# Patient Record
Sex: FEMALE | Race: BLACK OR AFRICAN AMERICAN | NOT HISPANIC OR LATINO | URBAN - METROPOLITAN AREA
[De-identification: names, ages, dates, MRNs, and addresses within clinical notes are randomized per-mention and may not be internally consistent; named-entity substitution may affect disease eponyms.]

---

## 2019-07-24 ENCOUNTER — TRANSCRIBE ORDERS (OUTPATIENT)
Dept: LAB | Facility: CLINIC | Age: 44
End: 2019-07-24

## 2019-07-24 ENCOUNTER — APPOINTMENT (OUTPATIENT)
Dept: LAB | Facility: CLINIC | Age: 44
End: 2019-07-24
Payer: COMMERCIAL

## 2019-07-24 DIAGNOSIS — Z79.899 ENCOUNTER FOR LONG-TERM (CURRENT) USE OF OTHER MEDICATIONS: ICD-10-CM

## 2019-07-24 DIAGNOSIS — Z79.899 ENCOUNTER FOR LONG-TERM (CURRENT) USE OF OTHER MEDICATIONS: Primary | ICD-10-CM

## 2019-07-24 LAB
ALBUMIN SERPL BCP-MCNC: 4.5 G/DL (ref 3.5–5)
ALP SERPL-CCNC: 97 U/L (ref 46–116)
ALT SERPL W P-5'-P-CCNC: 28 U/L (ref 12–78)
ANION GAP SERPL CALCULATED.3IONS-SCNC: 3 MMOL/L (ref 4–13)
AST SERPL W P-5'-P-CCNC: 16 U/L (ref 5–45)
BASOPHILS # BLD AUTO: 0.02 THOUSANDS/ΜL (ref 0–0.1)
BASOPHILS NFR BLD AUTO: 1 % (ref 0–1)
BILIRUB SERPL-MCNC: 0.18 MG/DL (ref 0.2–1)
BUN SERPL-MCNC: 14 MG/DL (ref 5–25)
CALCIUM SERPL-MCNC: 8.9 MG/DL (ref 8.3–10.1)
CHLORIDE SERPL-SCNC: 110 MMOL/L (ref 100–108)
CHOLEST SERPL-MCNC: 177 MG/DL (ref 50–200)
CO2 SERPL-SCNC: 22 MMOL/L (ref 21–32)
CREAT SERPL-MCNC: 1 MG/DL (ref 0.6–1.3)
EOSINOPHIL # BLD AUTO: 0.05 THOUSAND/ΜL (ref 0–0.61)
EOSINOPHIL NFR BLD AUTO: 2 % (ref 0–6)
ERYTHROCYTE [DISTWIDTH] IN BLOOD BY AUTOMATED COUNT: 12.7 % (ref 11.6–15.1)
GFR SERPL CREATININE-BSD FRML MDRD: 80 ML/MIN/1.73SQ M
GLUCOSE P FAST SERPL-MCNC: 71 MG/DL (ref 65–99)
HCT VFR BLD AUTO: 40.3 % (ref 34.8–46.1)
HDLC SERPL-MCNC: 77 MG/DL (ref 40–60)
HGB BLD-MCNC: 12.8 G/DL (ref 11.5–15.4)
IMM GRANULOCYTES # BLD AUTO: 0 THOUSAND/UL (ref 0–0.2)
IMM GRANULOCYTES NFR BLD AUTO: 0 % (ref 0–2)
LDLC SERPL CALC-MCNC: 89 MG/DL (ref 0–100)
LYMPHOCYTES # BLD AUTO: 1.19 THOUSANDS/ΜL (ref 0.6–4.47)
LYMPHOCYTES NFR BLD AUTO: 49 % (ref 14–44)
MCH RBC QN AUTO: 31.4 PG (ref 26.8–34.3)
MCHC RBC AUTO-ENTMCNC: 31.8 G/DL (ref 31.4–37.4)
MCV RBC AUTO: 99 FL (ref 82–98)
MONOCYTES # BLD AUTO: 0.18 THOUSAND/ΜL (ref 0.17–1.22)
MONOCYTES NFR BLD AUTO: 7 % (ref 4–12)
NEUTROPHILS # BLD AUTO: 1.01 THOUSANDS/ΜL (ref 1.85–7.62)
NEUTS SEG NFR BLD AUTO: 41 % (ref 43–75)
NONHDLC SERPL-MCNC: 100 MG/DL
NRBC BLD AUTO-RTO: 0 /100 WBCS
PLATELET # BLD AUTO: 198 THOUSANDS/UL (ref 149–390)
PMV BLD AUTO: 10.7 FL (ref 8.9–12.7)
POTASSIUM SERPL-SCNC: 3.3 MMOL/L (ref 3.5–5.3)
PROT SERPL-MCNC: 9.1 G/DL (ref 6.4–8.2)
RBC # BLD AUTO: 4.08 MILLION/UL (ref 3.81–5.12)
SODIUM SERPL-SCNC: 135 MMOL/L (ref 136–145)
TRIGL SERPL-MCNC: 55 MG/DL
TSH SERPL DL<=0.05 MIU/L-ACNC: 1.08 UIU/ML (ref 0.36–3.74)
WBC # BLD AUTO: 2.45 THOUSAND/UL (ref 4.31–10.16)

## 2019-07-24 PROCEDURE — 84443 ASSAY THYROID STIM HORMONE: CPT

## 2019-07-24 PROCEDURE — 36415 COLL VENOUS BLD VENIPUNCTURE: CPT

## 2019-07-24 PROCEDURE — 80053 COMPREHEN METABOLIC PANEL: CPT

## 2019-07-24 PROCEDURE — 80061 LIPID PANEL: CPT

## 2019-07-24 PROCEDURE — 85025 COMPLETE CBC W/AUTO DIFF WBC: CPT

## 2020-02-06 ENCOUNTER — APPOINTMENT (OUTPATIENT)
Dept: LAB | Facility: CLINIC | Age: 45
End: 2020-02-06
Payer: COMMERCIAL

## 2020-02-06 ENCOUNTER — TRANSCRIBE ORDERS (OUTPATIENT)
Dept: LAB | Facility: CLINIC | Age: 45
End: 2020-02-06

## 2020-02-06 DIAGNOSIS — G40.509 NONINTRACTABLE EPILEPSY DUE TO EXTERNAL CAUSES, WITHOUT STATUS EPILEPTICUS (HCC): Primary | ICD-10-CM

## 2020-02-06 DIAGNOSIS — G40.509 NONINTRACTABLE EPILEPSY DUE TO EXTERNAL CAUSES, WITHOUT STATUS EPILEPTICUS (HCC): ICD-10-CM

## 2020-02-06 LAB
ALBUMIN SERPL BCP-MCNC: 4 G/DL (ref 3.5–5)
ALP SERPL-CCNC: 86 U/L (ref 46–116)
ALT SERPL W P-5'-P-CCNC: 19 U/L (ref 12–78)
ANION GAP SERPL CALCULATED.3IONS-SCNC: 4 MMOL/L (ref 4–13)
AST SERPL W P-5'-P-CCNC: 12 U/L (ref 5–45)
BASOPHILS # BLD AUTO: 0.03 THOUSANDS/ΜL (ref 0–0.1)
BASOPHILS NFR BLD AUTO: 1 % (ref 0–1)
BILIRUB SERPL-MCNC: 0.35 MG/DL (ref 0.2–1)
BUN SERPL-MCNC: 6 MG/DL (ref 5–25)
CALCIUM SERPL-MCNC: 9.3 MG/DL (ref 8.3–10.1)
CHLORIDE SERPL-SCNC: 113 MMOL/L (ref 100–108)
CO2 SERPL-SCNC: 22 MMOL/L (ref 21–32)
CREAT SERPL-MCNC: 0.93 MG/DL (ref 0.6–1.3)
EOSINOPHIL # BLD AUTO: 0.1 THOUSAND/ΜL (ref 0–0.61)
EOSINOPHIL NFR BLD AUTO: 4 % (ref 0–6)
ERYTHROCYTE [DISTWIDTH] IN BLOOD BY AUTOMATED COUNT: 12.3 % (ref 11.6–15.1)
GFR SERPL CREATININE-BSD FRML MDRD: 87 ML/MIN/1.73SQ M
GLUCOSE P FAST SERPL-MCNC: 76 MG/DL (ref 65–99)
HCT VFR BLD AUTO: 41.5 % (ref 34.8–46.1)
HGB BLD-MCNC: 13.3 G/DL (ref 11.5–15.4)
IMM GRANULOCYTES # BLD AUTO: 0 THOUSAND/UL (ref 0–0.2)
IMM GRANULOCYTES NFR BLD AUTO: 0 % (ref 0–2)
LYMPHOCYTES # BLD AUTO: 0.86 THOUSANDS/ΜL (ref 0.6–4.47)
LYMPHOCYTES NFR BLD AUTO: 33 % (ref 14–44)
MCH RBC QN AUTO: 32.4 PG (ref 26.8–34.3)
MCHC RBC AUTO-ENTMCNC: 32 G/DL (ref 31.4–37.4)
MCV RBC AUTO: 101 FL (ref 82–98)
MONOCYTES # BLD AUTO: 0.17 THOUSAND/ΜL (ref 0.17–1.22)
MONOCYTES NFR BLD AUTO: 7 % (ref 4–12)
NEUTROPHILS # BLD AUTO: 1.43 THOUSANDS/ΜL (ref 1.85–7.62)
NEUTS SEG NFR BLD AUTO: 55 % (ref 43–75)
NRBC BLD AUTO-RTO: 0 /100 WBCS
PHENOBARB SERPL-MCNC: 52.7 UG/ML (ref 15–40)
PLATELET # BLD AUTO: 150 THOUSANDS/UL (ref 149–390)
PMV BLD AUTO: 11 FL (ref 8.9–12.7)
POTASSIUM SERPL-SCNC: 3.7 MMOL/L (ref 3.5–5.3)
PROT SERPL-MCNC: 8.1 G/DL (ref 6.4–8.2)
RBC # BLD AUTO: 4.1 MILLION/UL (ref 3.81–5.12)
SODIUM SERPL-SCNC: 139 MMOL/L (ref 136–145)
WBC # BLD AUTO: 2.59 THOUSAND/UL (ref 4.31–10.16)

## 2020-02-06 PROCEDURE — 80053 COMPREHEN METABOLIC PANEL: CPT

## 2020-02-06 PROCEDURE — 80184 ASSAY OF PHENOBARBITAL: CPT

## 2020-02-06 PROCEDURE — 36415 COLL VENOUS BLD VENIPUNCTURE: CPT

## 2020-02-06 PROCEDURE — 85025 COMPLETE CBC W/AUTO DIFF WBC: CPT

## 2020-02-06 PROCEDURE — 80235 DRUG ASSAY LACOSAMIDE: CPT

## 2020-02-11 LAB — LACOSAMIDE SERPL-MCNC: 9.1 UG/ML (ref 5–10)

## 2021-04-27 ENCOUNTER — IMMUNIZATIONS (OUTPATIENT)
Dept: FAMILY MEDICINE CLINIC | Facility: HOSPITAL | Age: 46
End: 2021-04-27

## 2021-04-27 DIAGNOSIS — Z23 ENCOUNTER FOR IMMUNIZATION: Primary | ICD-10-CM

## 2021-04-27 PROCEDURE — 0001A SARS-COV-2 / COVID-19 MRNA VACCINE (PFIZER-BIONTECH) 30 MCG: CPT

## 2021-04-27 PROCEDURE — 91300 SARS-COV-2 / COVID-19 MRNA VACCINE (PFIZER-BIONTECH) 30 MCG: CPT

## 2021-05-22 ENCOUNTER — IMMUNIZATIONS (OUTPATIENT)
Dept: FAMILY MEDICINE CLINIC | Facility: HOSPITAL | Age: 46
End: 2021-05-22

## 2021-05-22 DIAGNOSIS — Z23 ENCOUNTER FOR IMMUNIZATION: Primary | ICD-10-CM

## 2021-05-22 PROCEDURE — 91300 SARS-COV-2 / COVID-19 MRNA VACCINE (PFIZER-BIONTECH) 30 MCG: CPT

## 2021-05-22 PROCEDURE — 0002A SARS-COV-2 / COVID-19 MRNA VACCINE (PFIZER-BIONTECH) 30 MCG: CPT

## 2025-04-07 ENCOUNTER — OFFICE VISIT (OUTPATIENT)
Dept: NEUROLOGY | Facility: CLINIC | Age: 50
End: 2025-04-07
Payer: COMMERCIAL

## 2025-04-07 ENCOUNTER — TELEPHONE (OUTPATIENT)
Age: 50
End: 2025-04-07

## 2025-04-07 ENCOUNTER — APPOINTMENT (OUTPATIENT)
Age: 50
End: 2025-04-07
Payer: COMMERCIAL

## 2025-04-07 VITALS
BODY MASS INDEX: 20.83 KG/M2 | HEART RATE: 77 BPM | WEIGHT: 125 LBS | DIASTOLIC BLOOD PRESSURE: 70 MMHG | OXYGEN SATURATION: 98 % | SYSTOLIC BLOOD PRESSURE: 110 MMHG | HEIGHT: 65 IN

## 2025-04-07 DIAGNOSIS — G40.909 EPILEPSY (HCC): ICD-10-CM

## 2025-04-07 DIAGNOSIS — G40.909 EPILEPSY (HCC): Primary | ICD-10-CM

## 2025-04-07 LAB
ALBUMIN SERPL BCG-MCNC: 4.2 G/DL (ref 3.5–5)
ALP SERPL-CCNC: 148 U/L (ref 34–104)
ALT SERPL W P-5'-P-CCNC: 11 U/L (ref 7–52)
ANION GAP SERPL CALCULATED.3IONS-SCNC: 10 MMOL/L (ref 4–13)
AST SERPL W P-5'-P-CCNC: 13 U/L (ref 13–39)
BASOPHILS # BLD AUTO: 0.03 THOUSANDS/ÂΜL (ref 0–0.1)
BASOPHILS NFR BLD AUTO: 1 % (ref 0–1)
BILIRUB SERPL-MCNC: 0.25 MG/DL (ref 0.2–1)
BUN SERPL-MCNC: 9 MG/DL (ref 5–25)
CALCIUM SERPL-MCNC: 9.3 MG/DL (ref 8.4–10.2)
CHLORIDE SERPL-SCNC: 110 MMOL/L (ref 96–108)
CO2 SERPL-SCNC: 20 MMOL/L (ref 21–32)
CREAT SERPL-MCNC: 0.87 MG/DL (ref 0.6–1.3)
EOSINOPHIL # BLD AUTO: 0.05 THOUSAND/ÂΜL (ref 0–0.61)
EOSINOPHIL NFR BLD AUTO: 2 % (ref 0–6)
ERYTHROCYTE [DISTWIDTH] IN BLOOD BY AUTOMATED COUNT: 12.9 % (ref 11.6–15.1)
GFR SERPL CREATININE-BSD FRML MDRD: 79 ML/MIN/1.73SQ M
GLUCOSE P FAST SERPL-MCNC: 93 MG/DL (ref 65–99)
HCT VFR BLD AUTO: 37.9 % (ref 34.8–46.1)
HGB BLD-MCNC: 11.9 G/DL (ref 11.5–15.4)
IMM GRANULOCYTES # BLD AUTO: 0.01 THOUSAND/UL (ref 0–0.2)
IMM GRANULOCYTES NFR BLD AUTO: 0 % (ref 0–2)
LACOSAMIDE SERPL-MCNC: 6.79 UG/ML (ref 1–20)
LEVETIRACETAM SERPL-MCNC: 42.6 UG/ML (ref 12–46)
LYMPHOCYTES # BLD AUTO: 0.94 THOUSANDS/ÂΜL (ref 0.6–4.47)
LYMPHOCYTES NFR BLD AUTO: 39 % (ref 14–44)
MCH RBC QN AUTO: 30.4 PG (ref 26.8–34.3)
MCHC RBC AUTO-ENTMCNC: 31.4 G/DL (ref 31.4–37.4)
MCV RBC AUTO: 97 FL (ref 82–98)
MONOCYTES # BLD AUTO: 0.13 THOUSAND/ÂΜL (ref 0.17–1.22)
MONOCYTES NFR BLD AUTO: 5 % (ref 4–12)
NEUTROPHILS # BLD AUTO: 1.26 THOUSANDS/ÂΜL (ref 1.85–7.62)
NEUTS SEG NFR BLD AUTO: 53 % (ref 43–75)
NRBC BLD AUTO-RTO: 0 /100 WBCS
PHENOBARB SERPL-MCNC: 45.2 UG/ML (ref 10–40)
PLATELET # BLD AUTO: 237 THOUSANDS/UL (ref 149–390)
PMV BLD AUTO: 10.3 FL (ref 8.9–12.7)
POTASSIUM SERPL-SCNC: 3.9 MMOL/L (ref 3.5–5.3)
PROT SERPL-MCNC: 7.8 G/DL (ref 6.4–8.4)
RBC # BLD AUTO: 3.91 MILLION/UL (ref 3.81–5.12)
SODIUM SERPL-SCNC: 140 MMOL/L (ref 135–147)
WBC # BLD AUTO: 2.42 THOUSAND/UL (ref 4.31–10.16)

## 2025-04-07 PROCEDURE — 85025 COMPLETE CBC W/AUTO DIFF WBC: CPT

## 2025-04-07 PROCEDURE — 80235 DRUG ASSAY LACOSAMIDE: CPT

## 2025-04-07 PROCEDURE — 99205 OFFICE O/P NEW HI 60 MIN: CPT | Performed by: STUDENT IN AN ORGANIZED HEALTH CARE EDUCATION/TRAINING PROGRAM

## 2025-04-07 PROCEDURE — 80201 ASSAY OF TOPIRAMATE: CPT

## 2025-04-07 PROCEDURE — 80184 ASSAY OF PHENOBARBITAL: CPT

## 2025-04-07 PROCEDURE — 99417 PROLNG OP E/M EACH 15 MIN: CPT | Performed by: STUDENT IN AN ORGANIZED HEALTH CARE EDUCATION/TRAINING PROGRAM

## 2025-04-07 PROCEDURE — 83520 IMMUNOASSAY QUANT NOS NONAB: CPT

## 2025-04-07 PROCEDURE — 80053 COMPREHEN METABOLIC PANEL: CPT

## 2025-04-07 PROCEDURE — 36415 COLL VENOUS BLD VENIPUNCTURE: CPT

## 2025-04-07 RX ORDER — LEVETIRACETAM 1000 MG/1
1000 TABLET ORAL 2 TIMES DAILY
COMMUNITY
End: 2025-04-07

## 2025-04-07 RX ORDER — LEVETIRACETAM 500 MG/1
500 TABLET ORAL EVERY 12 HOURS SCHEDULED
Qty: 180 TABLET | Refills: 1 | Status: SHIPPED | OUTPATIENT
Start: 2025-04-07 | End: 2025-10-04

## 2025-04-07 RX ORDER — LACOSAMIDE 100 MG/1
100 TABLET ORAL EVERY 12 HOURS SCHEDULED
COMMUNITY
End: 2025-04-07

## 2025-04-07 RX ORDER — TOPIRAMATE 200 MG/1
200 TABLET, FILM COATED ORAL 2 TIMES DAILY
COMMUNITY
End: 2025-04-07

## 2025-04-07 RX ORDER — PHENOBARBITAL 97.2 MG/1
97.2 TABLET ORAL 2 TIMES DAILY
Qty: 180 TABLET | Refills: 1 | Status: SHIPPED | OUTPATIENT
Start: 2025-04-07 | End: 2025-10-04

## 2025-04-07 RX ORDER — LACOSAMIDE 100 MG/1
TABLET ORAL
Qty: 180 TABLET | Refills: 3 | Status: SHIPPED | OUTPATIENT
Start: 2025-04-07

## 2025-04-07 RX ORDER — LEVETIRACETAM 500 MG/1
500 TABLET ORAL EVERY 12 HOURS SCHEDULED
COMMUNITY
End: 2025-04-07

## 2025-04-07 RX ORDER — PHENOBARBITAL 97.2 MG/1
97.2 TABLET ORAL 2 TIMES DAILY
COMMUNITY
End: 2025-04-07 | Stop reason: SDUPTHER

## 2025-04-07 RX ORDER — LACOSAMIDE 200 MG/1
200 TABLET ORAL EVERY 12 HOURS SCHEDULED
COMMUNITY
End: 2025-04-07

## 2025-04-07 RX ORDER — TOPIRAMATE 100 MG/1
200 TABLET, FILM COATED ORAL 2 TIMES DAILY
Qty: 360 TABLET | Refills: 1 | Status: SHIPPED | OUTPATIENT
Start: 2025-04-07 | End: 2025-04-07 | Stop reason: SDUPTHER

## 2025-04-07 RX ORDER — TOPIRAMATE 200 MG/1
200 TABLET, FILM COATED ORAL 2 TIMES DAILY
Qty: 180 TABLET | Refills: 3 | Status: SHIPPED | OUTPATIENT
Start: 2025-04-07 | End: 2025-10-04

## 2025-04-07 RX ORDER — FOLIC ACID 1 MG/1
1 TABLET ORAL 2 TIMES DAILY
COMMUNITY
End: 2025-04-07 | Stop reason: SDUPTHER

## 2025-04-07 RX ORDER — LACOSAMIDE 200 MG/1
200 TABLET ORAL EVERY 12 HOURS SCHEDULED
Qty: 180 TABLET | Refills: 3 | Status: SHIPPED | OUTPATIENT
Start: 2025-04-07

## 2025-04-07 RX ORDER — FOLIC ACID 1 MG/1
1 TABLET ORAL 2 TIMES DAILY
Qty: 60 TABLET | Refills: 5 | Status: SHIPPED | OUTPATIENT
Start: 2025-04-07 | End: 2025-10-04

## 2025-04-07 RX ORDER — LACOSAMIDE 100 MG/1
300 TABLET ORAL EVERY 12 HOURS SCHEDULED
Qty: 540 TABLET | Refills: 1 | Status: SHIPPED | OUTPATIENT
Start: 2025-04-07 | End: 2025-04-07 | Stop reason: SDUPTHER

## 2025-04-07 NOTE — TELEPHONE ENCOUNTER
Pharmacist from [facility] pharmacy called and states that ins will not pay for topiramate 100 mg 2 tabs bid. However, insurance will pay for Topamax 200 mg 1 tab bid.    Same thing for Lacosamide 100 mg 3 tabs bid. Ins will not pay for this. Pt needs 2 separate sripts for 100 mg and 200 mg     Rxs entered. Pls review and sign off if agreeable

## 2025-04-07 NOTE — TELEPHONE ENCOUNTER
Pts mother calling in to let office know that due to traffic pt will be late this morning. About 20 mins. Did already inform pts mom that if pass the syeda period they may be asked to reschedule appt.

## 2025-04-07 NOTE — PROGRESS NOTES
Epilepsy Ambulatory Visit  Name: Nancy López       : 1975       MRN: [id number]   Encounter Provider: Lucero Pelletier MD   Encounter Date: 2025  Encounter department: [facility]    Nancy López is a 49 y.o. female with PMH of intellectual delay and medically refractory focal epilepsy s/p left temporoparietal epilepsy surgery () and VNS (with dead generator) who presents as initial consultation for epilepsy. She continues to have focal seizures multiple times per week, but no grand mal seizures since surgery. We will work on obtaining her records and continue current therapy.     4-Dimensional Classification  Epileptic Paroxysmal Episode   Semiology: 1. GTC 2. Dialeptic   Epileptogenic Zone: Left hemisphere  Etiology: Unknown  Comorbidities: intellectual delay  Current Epilepsy Medications: lacosamide 300-300, phenobarbital 97.2-97.2, levetiracetam 1131-3466, topiramate 200-200  Prior Epilepsy Medications: unknown     Assessment & Plan  Epilepsy ([facility])  [phone]  - continue lacosamide 300-300, phenobarbital 97.2-97.2, levetiracetam 1464-1843, topiramate 200-200  - continue folic acid 2mg daily   - AED levels, CBC, CMP  - seizure precautions  Orders:    Lacosamide; Future    Phenobarbital level; Future    Levetiracetam level; Future    Topiramate level; Future    lacosamide (VIMPAT) 100 mg tablet; Take 3 tablets (300 mg total) by mouth every 12 (twelve) hours    levETIRAcetam (Keppra) 500 mg tablet; Take 1 tablet (500 mg total) by mouth every 12 (twelve) hours    PHENobarbital 97.2 MG tablet; Take 1 tablet (97.2 mg total) by mouth 2 (two) times a day    topiramate (Topamax) 100 mg tablet; Take 2 tablets (200 mg total) by mouth 2 (two) times a day    folic acid (FOLVITE) 1 mg tablet; Take 1 tablet (1 mg total) by mouth 2 (two) times a day    CBC and differential; Future    Comprehensive metabolic panel;  "Future     RTC 4 mo        HISTORY OF PRESENT ILLNESS    Per my chart review:   Per family medicine note in September:   \"She reports an increase in seizure frequency, experiencing around 10 seizures or more in the past few weeks. She is currently on lacosamide (Vimpat) 300 mg twice a day, phenobarbital 97.2 mg twice a day, Keppra 1500 mg twice a day, and Topamax 200 mg twice a day. She also has Valtoco spray, which she does not use but keeps as a precaution. Her neurologist in [place] has not yet adjusted her medication levels, and she is scheduled for blood work soon. A consent form for the release of information from her neurologist will be provided to obtain her medical records. She will ensure that the blood work is sent out.\"    Per [facility] admission note in 2013:   \"She is a 37-year-old woman who carries the diagnosis of refractory epilepsy: She has a history of head trauma at an early age. From there on, she developed refractory epilepsy. She has a history of brain surgery with a left posterior temporal lobe resection (epilepsy seizure Peng) in 08/2005. She had VNS implanted in 1997 with battery replacement in 2005; however, she has continued to remain refractory to medical therapy. Therewas another evaluation for potential surgery in 2009, however, she did not have a second epilepsy surgery.\"    Per patient:  She presents today with her mother Nico Wagner. She was born normally and full-term. Her development was normal. At around 6 years old she started to have \"grand mal\" seizures. She does have a history of status epilepticus at age 11 and was intubated. Seizures were occurring daily. At age 14 she had a vagus nerve stimulator, but it is inactive now as it did not help. About 25 years ago, she had brain surgery. Her seizures improved significantly after that, but did not completely go away. She stopped having grand mal seizures and her seizure frequency improved.     She tells me that in the " "past, she would know she was going to have a seizure. However she has trouble describing what this feeling it. She currently has \"petit mal\" seizures, where she stares and makes a noise that sounds like gasping, which lasts for less than a minute. She is staring and not interacting. She does sometimes have a rocking movement and fiddles with her hand.     In the past, her mother describes the GTCs as follows: She would make a loud gasping sound, have full body shaking and foaming at the mouth. Often she would fall down. Her head would be turned to the side but not sure which side. She would not bite her tongue but would have urinary incontinence. She would have deep breathing afterwards and be lethargic. Then she would go to sleep.     She was told that cognitively she was close to normal but regressed due to seizures. She takes medicine herself and has a job. Per her mother the phenobarbital makes her very sleepy.     In the past, her phenobarbital was stopped and she started having more frequent seizures. She has been told that she needs to take brand name Keppra but not sure why.     She had a seizure this morning that lasted a couple seconds.       Epilepsy Risk Factors: Patient is a product of uncomplicated full term pregnancy, met appropriate development milestones. No learning disabilities or cognitive delay. No h/o febrile seizures, CNS infections, strokes, or CNS neoplasms.  There is no family h/o of seizures or epilepsy.    Prior Work-up:   CT Head 8/9/13 ([facility]):  Redemonstrated are postsurgical changes related to a prior left   frontotemporal parietal craniotomy.  Extensive encephalomalacic changes   are demonstrated within the left parietotemporal occipital region.  There   remains ex vacuo dilation of the atrium and posterior horn of the left   lateral ventricular system.  The ventricular system is unchanged in size.    There is no evidence of acute intracranial hemorrhage or extra-axial   collection.  " The basal cisterns are patent.  There is no mass effect or   midline shift.      Diffuse calvarial thickening is demonstrated, as before, unchanged dating   back to 04/10/08.   REEGs (2020):  This EEG recorded in the awake and drowsy states is abnormal, because of intermittent left frontal/temporal slowing and epileptiform activity in the left frontal/temporal regions.      Past Medical History:    Past Medical History:   Diagnosis Date    Seizures (HCC)      Past Surgical History:   Procedure Laterality Date    BRAIN SURGERY      does not remember the date       Family History:  History reviewed. No pertinent family history.  There is no family history of epilepsy.     Social History:  Social History     Tobacco Use    Smoking status: Never     Passive exposure: Never    Smokeless tobacco: Never   Vaping Use    Vaping status: Never Used   Substance Use Topics    Alcohol use: Never    Drug use: Never        Living situation:  Lives with her mother and step-father, her sister, and niece, and nephew.   Work:  works at [employer], she does [employer], labeling  Driving:  No    Allergies:  Allergies   Allergen Reactions    Chocolate Flavor - Food Allergy Hives    Nuts - Food Allergy Hives     Nut with chocolate        Medications:    Current Outpatient Medications:     folic acid (FOLVITE) 1 mg tablet, Take 1 tablet (1 mg total) by mouth 2 (two) times a day, Disp: 60 tablet, Rfl: 5    lacosamide (VIMPAT) 100 mg tablet, Take 3 tablets (300 mg total) by mouth every 12 (twelve) hours, Disp: 540 tablet, Rfl: 1    levETIRAcetam (Keppra) 500 mg tablet, Take 1 tablet (500 mg total) by mouth every 12 (twelve) hours, Disp: 180 tablet, Rfl: 1    PHENobarbital 97.2 MG tablet, Take 1 tablet (97.2 mg total) by mouth 2 (two) times a day, Disp: 180 tablet, Rfl: 1    topiramate (Topamax) 100 mg tablet, Take 2 tablets (200 mg total) by mouth 2 (two) times a day, Disp: 360 tablet, Rfl: 1      OBJECTIVE  /70 (BP Location: Left  "arm, Patient Position: Sitting, Cuff Size: Adult)   Pulse 77   Ht 5' 5\" (1.651 m)   Wt 56.7 kg (125 lb)   SpO2 98%   BMI 20.80 kg/m²      Labs  I have reviewed pertinent labs:  CBC:   Lab Results   Component Value Date    WBC 2.59 (L) 02/06/2020    RBC 4.10 02/06/2020    HGB 13.3 02/06/2020    HCT 41.5 02/06/2020     (H) 02/06/2020     02/06/2020    MCH 32.4 02/06/2020    MCHC 32.0 02/06/2020    RDW 12.3 02/06/2020    MPV 11.0 02/06/2020    NEUTROABS 1.43 (L) 02/06/2020     CMP:   Lab Results   Component Value Date    SODIUM 142 10/19/2024    K 4 10/19/2024     (H) 10/19/2024    CO2 20 (L) 10/19/2024    AGAP 12 (H) 10/19/2024    BUN 6 (L) 10/19/2024    CREATININE 0.79 10/19/2024    GLUC 90 10/19/2024    GLUF 76 02/06/2020    CALCIUM 9.5 10/19/2024    AST 13 10/19/2024    ALT 12 10/19/2024    ALKPHOS 97 10/19/2024    TP 8 10/19/2024    ALB 4.1 10/19/2024    TBILI 0.3 10/19/2024    EGFR 92 10/19/2024       Lab Results   Component Value Date/Time    XGIDCBDJ27 406 04/16/2021 01:05 PM    TSH 0.69 10/19/2024 02:13 PM    QPK4CWAXZTJG 1.080 07/24/2019 01:54 PM    HGBA1C 5.4 10/19/2024 02:13 PM    FOLATE >17.5 04/16/2021 01:05 PM          General Exam  General: Well-appearing, in no distress.   Cardiac: Warm and well-perfused  Pulmonary: Easy work of breathing    Neurologic Exam  Mental Status: Alert and oriented to person, place, and time. Oriented to president.   Language: Fluent, comprehension intact. She perseverates on certain topics.   Cranial Nerves: PERRL. EOMI with no nystagmus. Face is symmetric. No dysarthria. Hearing is intact to conversation.    Motor:  5/5 strength throughout  Gait: Normal       Administrative Statements   The total amount of time spent with the patient and on chart review and documentation was 75 minutes. Issues addressed during this visit included counseling on diagnosis and prognosis, counseling on medical management, and counseling on medication side effects.     "

## 2025-04-09 LAB — TOPIRAMATE SERPL-MCNC: 5.8 UG/ML (ref 2–25)

## 2025-04-10 ENCOUNTER — TELEPHONE (OUTPATIENT)
Age: 50
End: 2025-04-10

## 2025-04-10 NOTE — TELEPHONE ENCOUNTER
[facility] calling for us to refax medical records request to [phone] as it was blurry.  Please assist.

## 2025-04-10 NOTE — TELEPHONE ENCOUNTER
They are also requesting us to confirm if we want all records from 2004 or just the most recent records.  Please clarify.

## 2025-04-14 ENCOUNTER — TELEPHONE (OUTPATIENT)
Age: 50
End: 2025-04-14

## 2025-04-14 DIAGNOSIS — G40.909 EPILEPSY (HCC): Primary | ICD-10-CM

## 2025-04-14 RX ORDER — LEVETIRACETAM 750 MG/1
1500 TABLET ORAL 2 TIMES DAILY
Qty: 120 TABLET | Refills: 5 | Status: SHIPPED | OUTPATIENT
Start: 2025-04-14

## 2025-04-14 NOTE — TELEPHONE ENCOUNTER
Pt's mother, Nicolas, called on the triage line re: pt's levetiracetam. She states that pt takes levetiracetam 1500 mg BID, however she was only ordered 500 mg BID. Informed Nicolas that this writer would bring it to Dr. Pelletier's attention and she verbalized an understanding.    She also forgot to mention to Dr. Pelletier at the consult that pt should be on brand name necessary Keppra. She would like the Keppra sent to [facility] on [address], in [place].    Please sign off if agreeable. Thank you.

## 2025-04-15 RX ORDER — LEVETIRACETAM 750 MG/1
1500 TABLET, FILM COATED ORAL 2 TIMES DAILY
Qty: 120 TABLET | Refills: 11 | Status: SHIPPED | OUTPATIENT
Start: 2025-04-15

## 2025-04-15 NOTE — TELEPHONE ENCOUNTER
Called re: Dr. Pelletier's message below and spoke with pt's mother, who verbalized an understanding.    She also wanted to alert Dr. Pelletier that the pharmacy did not give her the brand name Keppra.    Brand Keppra pended below. Please sign off if agreeable. Thank you.    Lucero Pelletier MD to Me     4/14/25  3:48 PM   Done and please apologize to the patient on my behalf!

## 2025-05-16 ENCOUNTER — TELEPHONE (OUTPATIENT)
Dept: NEUROLOGY | Facility: CLINIC | Age: 50
End: 2025-05-16